# Patient Record
Sex: FEMALE | Race: BLACK OR AFRICAN AMERICAN | NOT HISPANIC OR LATINO | Employment: PART TIME
[De-identification: names, ages, dates, MRNs, and addresses within clinical notes are randomized per-mention and may not be internally consistent; named-entity substitution may affect disease eponyms.]

---
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
## 2019-07-30 ENCOUNTER — TELEPHONE (OUTPATIENT)
Dept: FAMILY MEDICINE | Facility: CLINIC | Age: 19
End: 2019-07-30

## 2019-07-30 ENCOUNTER — OFFICE VISIT (OUTPATIENT)
Dept: FAMILY MEDICINE | Facility: CLINIC | Age: 19
End: 2019-07-30
Payer: COMMERCIAL

## 2019-07-30 VITALS
HEIGHT: 69 IN | SYSTOLIC BLOOD PRESSURE: 112 MMHG | HEART RATE: 61 BPM | DIASTOLIC BLOOD PRESSURE: 68 MMHG | WEIGHT: 147 LBS | TEMPERATURE: 98.2 F | BODY MASS INDEX: 21.77 KG/M2

## 2019-07-30 DIAGNOSIS — Z00.129 ENCOUNTER FOR ROUTINE CHILD HEALTH EXAMINATION WITHOUT ABNORMAL FINDINGS: ICD-10-CM

## 2019-07-30 PROCEDURE — 99385 PREV VISIT NEW AGE 18-39: CPT | Performed by: FAMILY MEDICINE

## 2019-07-30 ASSESSMENT — ENCOUNTER SYMPTOMS
PALPITATIONS: 0
CHILLS: 0
PARESTHESIAS: 0
NAUSEA: 0
ARTHRALGIAS: 0
DYSURIA: 0
HEMATOCHEZIA: 0
ABDOMINAL PAIN: 0
EYE PAIN: 0
WEAKNESS: 0
HEMATURIA: 0
FREQUENCY: 0
CONSTIPATION: 0
NERVOUS/ANXIOUS: 0
SHORTNESS OF BREATH: 0
FEVER: 0
BREAST MASS: 0
SORE THROAT: 0
HEADACHES: 0
MYALGIAS: 0
COUGH: 0
DIZZINESS: 0
DIARRHEA: 0
HEARTBURN: 0
JOINT SWELLING: 0

## 2019-07-30 ASSESSMENT — MIFFLIN-ST. JEOR: SCORE: 1510.13
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
## 2019-07-30 NOTE — TELEPHONE ENCOUNTER
Reason for Call:  Form, our goal is to have forms completed with 72 hours, however, some forms may require a visit or additional information.    Type of letter, form or note:  [facility] Forms    Who is the form from?: Patient    Where did the form come from: Patient or family brought in       What clinic location was the form placed at?: [facility])    Where the form was placed: Given to MA/RN    What number is listed as a contact on the form?: [phone]       Additional comments: Patient states she needs the forms completed by August 1st as they are due then.  Dr. KAY stated that he could email the forms to her.  Her email is [email or web address]  If you can't email, call her and let her know and she will have to pick it up at the .      Call taken on 7/30/2019 at 3:22 PM by Janeen Tran

## 2019-07-31 NOTE — TELEPHONE ENCOUNTER
Date forms received: 07/31/2019  Form completed as much as possible by Gi Nj.  Forms placed: in providers folder Date placed: 07/31/2019  Gi Nj

## 2019-08-01 NOTE — TELEPHONE ENCOUNTER
Reason for Call:  Other / Forms deadline    Detailed comments: Patient called and stated her deadline to turn these forms in is today.  Patient is requesting if they could be completed today so she can meet her deadline.    Phone Number Patient can be reached at: Cell number on file:    Telephone Information:   Mobile [phone]     Best Time: ASAP    Can we leave a detailed message on this number? NO    Call taken on 8/1/2019 at 4:10 PM by Nikki Munson

## 2023-10-12 ENCOUNTER — MEDICAL CORRESPONDENCE (OUTPATIENT)
Dept: HEALTH INFORMATION MANAGEMENT | Facility: CLINIC | Age: 23
End: 2023-10-12
Payer: COMMERCIAL

## 2023-10-24 ENCOUNTER — TRANSCRIBE ORDERS (OUTPATIENT)
Dept: OTHER | Age: 23
End: 2023-10-24

## 2023-10-24 DIAGNOSIS — N89.8 VAGINAL DISCHARGE: Primary | ICD-10-CM

## 2023-11-16 ENCOUNTER — APPOINTMENT (OUTPATIENT)
Dept: GENERAL RADIOLOGY | Facility: CLINIC | Age: 23
End: 2023-11-16
Attending: EMERGENCY MEDICINE
Payer: COMMERCIAL

## 2023-11-16 ENCOUNTER — HOSPITAL ENCOUNTER (EMERGENCY)
Facility: CLINIC | Age: 23
Discharge: HOME | End: 2023-11-16
Attending: EMERGENCY MEDICINE | Admitting: EMERGENCY MEDICINE
Payer: COMMERCIAL

## 2023-11-16 VITALS
BODY MASS INDEX: 24.71 KG/M2 | WEIGHT: 166.8 LBS | TEMPERATURE: 98.7 F | HEIGHT: 69 IN | HEART RATE: 62 BPM | DIASTOLIC BLOOD PRESSURE: 70 MMHG | SYSTOLIC BLOOD PRESSURE: 110 MMHG | OXYGEN SATURATION: 100 % | RESPIRATION RATE: 14 BRPM

## 2023-11-16 DIAGNOSIS — S61.315A: Primary | ICD-10-CM

## 2023-11-16 DIAGNOSIS — S61.309A FINGERNAIL AVULSION, COMPLETE, INITIAL ENCOUNTER: ICD-10-CM

## 2023-11-16 PROCEDURE — 99284 EMERGENCY DEPT VISIT MOD MDM: CPT | Performed by: EMERGENCY MEDICINE

## 2023-11-16 PROCEDURE — 99283 EMERGENCY DEPT VISIT LOW MDM: CPT

## 2023-11-16 PROCEDURE — 73140 X-RAY EXAM OF FINGER(S): CPT | Mod: LT

## 2023-11-16 RX ORDER — CEPHALEXIN 500 MG/1
500 CAPSULE ORAL 3 TIMES DAILY
Qty: 21 CAPSULE | Refills: 0 | Status: SHIPPED | OUTPATIENT
Start: 2023-11-16 | End: 2023-11-23

## 2023-11-16 RX ORDER — LEVONORGESTREL AND ETHINYL ESTRADIOL 0.15-0.03
1 KIT ORAL DAILY
COMMUNITY
Start: 2022-11-14

## 2023-11-16 ASSESSMENT — ACTIVITIES OF DAILY LIVING (ADL): ADLS_ACUITY_SCORE: 35

## 2023-11-17 NOTE — ED PROVIDER NOTES
"ED Provider Note  [facility]      History     Chief Complaint   Patient presents with    Injury     Left hand ring finger got caught in her car door.       Injury    Robert Joseph is a 23 year old female who presents with injury to the ring finger of her left hand after accidentally slamming a car door on it.  Patient is right-handed.  She states that her hand got caught behind and got slammed in a car door by accident, sustaining trauma to the distal fingertip.  She lost her nail on that finger.  Her last tetanus immunization by [facility] records show 2019 is last Tdap booster administration so patient is up to date at this time.  Denies additional complaints at this time.    Past Medical History  History reviewed. No pertinent past medical history.  History reviewed. No pertinent surgical history.  cephALEXin (KEFLEX) 500 MG capsule  KURVELO 0.15-30 MG-MCG tablet      No Known Allergies  Family History  History reviewed. No pertinent family history.  Social History   Social History     Tobacco Use    Smoking status: Never    Smokeless tobacco: Never   Substance Use Topics    Alcohol use: No    Drug use: No      Past medical history, past surgical history, medications, allergies, family history, and social history were reviewed with the patient. No additional pertinent items.      A medically appropriate review of systems was performed with pertinent positives and negatives noted in the HPI, and all other systems negative.    Physical Exam   BP: 114/75  Pulse: 60  Temp: 98.7  F (37.1  C)  Resp: 15  Height: 175.3 cm (5' 9\")  Weight: 75.7 kg (166 lb 12.8 oz)  SpO2: 100 %  Physical Exam  Vitals reviewed.   Constitutional:       Appearance: Normal appearance.   HENT:      Head: Normocephalic and atraumatic.      Mouth/Throat:      Mouth: Mucous membranes are moist.      Pharynx: Oropharynx is clear.   Eyes:      Extraocular Movements: Extraocular movements intact.      Pupils: Pupils are " equal, round, and reactive to light.   Cardiovascular:      Rate and Rhythm: Normal rate and regular rhythm.   Pulmonary:      Effort: Pulmonary effort is normal.      Breath sounds: Normal breath sounds.   Musculoskeletal:         General: Tenderness present.      Cervical back: Normal range of motion and neck supple.   Skin:     General: Skin is warm and dry.      Comments: Avulsion of nail without nailbed laceration to left fourth digit. Dried blood with no active bleeding.    Neurological:      Mental Status: She is alert.           ED Course, Procedures, & Data      Procedures                     Results for orders placed or performed during the hospital encounter of 11/16/23   Fingers XR, 2-3 views, left     Status: None    Narrative    EXAM: XR FINGER LEFT G/E 2 VIEWS  LOCATION: [facility]  DATE: 11/16/2023    INDICATION: slammed fourth finger of left hand in cardoor, finger pain, eval for fracture of distal finger  COMPARISON: None.      Impression    IMPRESSION: Punctate opacity over the tuft of the fourth distal phalanx which may reflect a foreign body or small fracture fragment. Normal joint spaces and alignment.     NOTE: ABNORMAL REPORT    THE DICTATION ABOVE DESCRIBES AN ABNORMALITY FOR WHICH FOLLOW-UP IS NEEDED.       Medications - No data to display  Labs Ordered and Resulted from Time of ED Arrival to Time of ED Departure - No data to display  Fingers XR, 2-3 views, left   Final Result   IMPRESSION: Punctate opacity over the tuft of the fourth distal phalanx which may reflect a foreign body or small fracture fragment. Normal joint spaces and alignment.       NOTE: ABNORMAL REPORT      THE DICTATION ABOVE DESCRIBES AN ABNORMALITY FOR WHICH FOLLOW-UP IS NEEDED.                Critical care was not performed.     Medical Decision Making  The patient's presentation was of low complexity (an acute and uncomplicated illness or injury).    The patient's  evaluation involved:  review of external note(s) from 1 sources (see separate area of note for details)  ordering and/or review of 1 test(s) in this encounter (see separate area of note for details)    The patient's management necessitated only low risk treatment.    Assessment & Plan        Robert Joseph is a 23 year old female who presents with injury to the ring finger of her left hand after accidentally slamming a car door on it. Patient is nontoxic appearing on exam and has vital signs within normal limits. Her tetanus immunization is up to date. On exam she has an avulsion of her nail without nailbed laceration to left fourth digit (ring finger) of the left hand. Dried blood present with no active bleeding. X-ray obtained to evaluate for fracture which showed a punctate fragment suggestive of possible small fracture fragment vs foreign body. Clinically there is no foreign body on exam. No open lesion suggestive of open fracture however with the involvement of the injury near the nailbed with a possible fracture fragment on x-ray, we went the precautionary route and started the patient on antibiotics and cleaned the wound site. Patient discharged with short course of antibiotics, outpatient follow up and strict return precautions.       I have reviewed the nursing notes. I have reviewed the findings, diagnosis, plan and need for follow up with the patient.    Discharge Medication List as of 11/16/2023  9:50 PM        START taking these medications    Details   cephALEXin (KEFLEX) 500 MG capsule Take 1 capsule (500 mg) by mouth 3 times daily for 7 days, Disp-21 capsule, R-0, E-Prescribe             Final diagnoses:   Fingernail avulsion, complete, initial encounter       Atif Tamayo MD  [facility] EMERGENCY DEPARTMENT  11/16/2023     Atif Tamayo MD  11/23/23 0244

## 2023-11-17 NOTE — ED NOTES
Pt unaware of tetanus shot status, [facility] records show 2019 as last Tdap booster.

## 2023-11-17 NOTE — ED TRIAGE NOTES
Pt slammed fifth digit of L hand in car door, bleeding controlled.     Triage Assessment (Adult)       Row Name 11/16/23 2047          Triage Assessment    Airway WDL WDL        Respiratory WDL    Respiratory WDL WDL        Peripheral/Neurovascular WDL    Peripheral Neurovascular WDL WDL